# Patient Record
(demographics unavailable — no encounter records)

---

## 2025-05-12 NOTE — HISTORY OF PRESENT ILLNESS
[Sharp] : sharp [] : no [FreeTextEntry5] : 69 y/o M presents for MANUELITO granadso today. Pt reports of pain since Tuesday. Notes bending down and feeling a pull in his hip. No prior tx. Denies any N/T.

## 2025-05-12 NOTE — ASSESSMENT
[FreeTextEntry1] : The patient is a 71 yo man presenting with anterior hip pain of 2 days. He reports that his pain occurred after walking and twisting. He felt a sharp pain to the top of his pelvis. He denies groin or lateral hip pain. He denies lumbar spine pain. His pain occurs superior to the groin, likely at the ASIS. He has pain with walking and overuse. He has pain with twisting and bending but not at rest or at night. He denies trauma or paresthesias. He has not had any treatment at this time.   Right hip exam: The patient presents in no apparent distress. Neurovascularly intact. Sensation intact to right lower extremity. No scars, cuts or abrasions. 2+ pulses to posterior tibialis. ROM is full and painless.  Tender to the superior pelvis and at the ASIS with palpation. - abductor tenderness. - groin pain. - lateral hip tenderness. - radiculopathy. + straight leg raise. 5/5 abductor strength. - pain with forced ER/IR.  Right hip xrays taken today in office, 1 view AP pelvis and two views hip, WB: No fractures or avlusions. No loose bodies. No OA of the right hip. No obvious tumors, masses, or lesions  The patient reports pain has slightly improved. He was prescribed a medrol dose pack. He will ice and use voltaren gel as needed. He will call if not better for MRI.

## 2025-05-12 NOTE — ASSESSMENT
[FreeTextEntry1] : The patient is a 69 yo man presenting with anterior hip pain of 2 days. He reports that his pain occurred after walking and twisting. He felt a sharp pain to the top of his pelvis. He denies groin or lateral hip pain. He denies lumbar spine pain. His pain occurs superior to the groin, likely at the ASIS. He has pain with walking and overuse. He has pain with twisting and bending but not at rest or at night. He denies trauma or paresthesias. He has not had any treatment at this time.   Right hip exam: The patient presents in no apparent distress. Neurovascularly intact. Sensation intact to right lower extremity. No scars, cuts or abrasions. 2+ pulses to posterior tibialis. ROM is full and painless.  Tender to the superior pelvis and at the ASIS with palpation. - abductor tenderness. - groin pain. - lateral hip tenderness. - radiculopathy. + straight leg raise. 5/5 abductor strength. - pain with forced ER/IR.  Right hip xrays taken today in office, 1 view AP pelvis and two views hip, WB: No fractures or avlusions. No loose bodies. No OA of the right hip. No obvious tumors, masses, or lesions  The patient reports pain has slightly improved. He was prescribed a medrol dose pack. He will ice and use voltaren gel as needed. He will call if not better for MRI.

## 2025-05-12 NOTE — HISTORY OF PRESENT ILLNESS
[Sharp] : sharp [] : no [FreeTextEntry5] : 71 y/o M presents for MANUELITO granados today. Pt reports of pain since Tuesday. Notes bending down and feeling a pull in his hip. No prior tx. Denies any N/T.